# Patient Record
Sex: FEMALE | Race: OTHER | Employment: STUDENT | URBAN - METROPOLITAN AREA
[De-identification: names, ages, dates, MRNs, and addresses within clinical notes are randomized per-mention and may not be internally consistent; named-entity substitution may affect disease eponyms.]

---
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
## 2020-11-23 ENCOUNTER — IMMUNIZATION (OUTPATIENT)
Dept: FAMILY MEDICINE CLINIC | Facility: CLINIC | Age: 20
End: 2020-11-23
Payer: COMMERCIAL

## 2020-11-23 PROCEDURE — 90686 IIV4 VACC NO PRSV 0.5 ML IM: CPT | Performed by: NURSE PRACTITIONER

## 2020-11-23 PROCEDURE — 90471 IMMUNIZATION ADMIN: CPT | Performed by: NURSE PRACTITIONER

## 2021-08-13 ENCOUNTER — OFFICE VISIT (OUTPATIENT)
Dept: FAMILY MEDICINE CLINIC | Facility: CLINIC | Age: 21
End: 2021-08-13
Payer: COMMERCIAL

## 2021-08-13 VITALS
HEART RATE: 82 BPM | TEMPERATURE: 98 F | HEIGHT: 63 IN | WEIGHT: 111 LBS | DIASTOLIC BLOOD PRESSURE: 70 MMHG | SYSTOLIC BLOOD PRESSURE: 100 MMHG | RESPIRATION RATE: 16 BRPM | BODY MASS INDEX: 19.67 KG/M2

## 2021-08-13 DIAGNOSIS — Z13.89 SCREENING FOR GENITOURINARY CONDITION: ICD-10-CM

## 2021-08-13 DIAGNOSIS — Z00.00 LABORATORY EXAMINATION ORDERED AS PART OF A ROUTINE GENERAL MEDICAL EXAMINATION: ICD-10-CM

## 2021-08-13 DIAGNOSIS — Z00.00 PHYSICAL EXAM, ANNUAL: Primary | ICD-10-CM

## 2021-08-13 PROCEDURE — 3078F DIAST BP <80 MM HG: CPT | Performed by: FAMILY MEDICINE

## 2021-08-13 PROCEDURE — 3008F BODY MASS INDEX DOCD: CPT | Performed by: FAMILY MEDICINE

## 2021-08-13 PROCEDURE — 3074F SYST BP LT 130 MM HG: CPT | Performed by: FAMILY MEDICINE

## 2021-08-13 PROCEDURE — 99395 PREV VISIT EST AGE 18-39: CPT | Performed by: FAMILY MEDICINE

## 2021-08-13 NOTE — PROGRESS NOTES
HPI:   Bradley Currie is a 24year old female who presents for a complete physical exam. Symptoms: denies discharge, itching, burning or dysuria. Patient complains of having pain in her right knee after running. Went away after she stopped running.  She will otherwise  SKIN: denies any unusual skin lesions  EYES:denies blurred vision or double vision  HEENT: denies nasal congestion, sinus pain or ST  LUNGS: denies shortness of breath with exertion  CARDIOVASCULAR: denies chest pain on exertion  GI: denies abdo No prescriptions requested or ordered in this encounter   Healthy diet. Stay active. Do some exercises for inner thighs as we discussed in the office. Imaging & Consults:  None    Pap and pelvic deferred.  Health maintenance, will check fasting Rosa Maria Denise

## 2021-08-18 LAB
ABSOLUTE BASOPHILS: 40 CELLS/UL (ref 0–200)
ABSOLUTE EOSINOPHILS: 70 CELLS/UL (ref 15–500)
ABSOLUTE LYMPHOCYTES: 1993 CELLS/UL (ref 850–3900)
ABSOLUTE MONOCYTES: 339 CELLS/UL (ref 200–950)
ABSOLUTE NEUTROPHILS: 1958 CELLS/UL (ref 1500–7800)
ALBUMIN/GLOBULIN RATIO: 1.5 (CALC) (ref 1–2.5)
ALBUMIN: 4.4 G/DL (ref 3.6–5.1)
ALKALINE PHOSPHATASE: 53 U/L (ref 31–125)
ALT: 13 U/L (ref 6–29)
AST: 22 U/L (ref 10–30)
BASOPHILS: 0.9 %
BILIRUBIN, TOTAL: 0.3 MG/DL (ref 0.2–1.2)
BILIRUBIN: NEGATIVE
BUN: 22 MG/DL (ref 7–25)
CALCIUM: 9.2 MG/DL (ref 8.6–10.2)
CARBON DIOXIDE: 26 MMOL/L (ref 20–32)
CHLORIDE: 105 MMOL/L (ref 98–110)
CHOL/HDLC RATIO: 3 (CALC)
CHOLESTEROL, TOTAL: 130 MG/DL
COLOR: YELLOW
CREATININE: 0.71 MG/DL (ref 0.5–1.1)
EGFR IF AFRICN AM: 141 ML/MIN/1.73M2
EGFR IF NONAFRICN AM: 122 ML/MIN/1.73M2
EOSINOPHILS: 1.6 %
GLOBULIN: 3 G/DL (CALC) (ref 1.9–3.7)
GLUCOSE: 75 MG/DL (ref 65–99)
GLUCOSE: NEGATIVE
HDL CHOLESTEROL: 43 MG/DL
HEMATOCRIT: 38 % (ref 35–45)
HEMOGLOBIN: 12.4 G/DL (ref 11.7–15.5)
KETONES: NEGATIVE
LDL-CHOLESTEROL: 73 MG/DL (CALC)
LYMPHOCYTES: 45.3 %
MCH: 28.6 PG (ref 27–33)
MCHC: 32.6 G/DL (ref 32–36)
MCV: 87.8 FL (ref 80–100)
MONOCYTES: 7.7 %
MPV: 10.3 FL (ref 7.5–12.5)
NEUTROPHILS: 44.5 %
NITRITE: NEGATIVE
NON-HDL CHOLESTEROL: 87 MG/DL (CALC)
OCCULT BLOOD: NEGATIVE
PH: 6.5 (ref 5–8)
PLATELET COUNT: 179 THOUSAND/UL (ref 140–400)
POTASSIUM: 4.1 MMOL/L (ref 3.5–5.3)
PROTEIN, TOTAL: 7.4 G/DL (ref 6.1–8.1)
PROTEIN: NEGATIVE
RDW: 13.5 % (ref 11–15)
RED BLOOD CELL COUNT: 4.33 MILLION/UL (ref 3.8–5.1)
SODIUM: 137 MMOL/L (ref 135–146)
SPECIFIC GRAVITY: 1.03 (ref 1–1.03)
TRIGLYCERIDES: 64 MG/DL
TSH W/REFLEX TO FT4: 2.31 MIU/L
WHITE BLOOD CELL COUNT: 4.4 THOUSAND/UL (ref 3.8–10.8)

## 2021-08-23 ENCOUNTER — PATIENT MESSAGE (OUTPATIENT)
Dept: FAMILY MEDICINE CLINIC | Facility: CLINIC | Age: 21
End: 2021-08-23

## 2021-08-26 NOTE — TELEPHONE ENCOUNTER
From: Elham Aguiar  To: Elmira Ventura MD  Sent: 8/23/2021 10:46 AM CDT  Subject: Test Results Question    Hi, Dr. Helen Hennessy. I have a question regarding my HDL being on the lower level as shown in my blood work results.  Your recommendation to remed

## 2022-07-17 ENCOUNTER — PATIENT MESSAGE (OUTPATIENT)
Dept: FAMILY MEDICINE CLINIC | Facility: CLINIC | Age: 22
End: 2022-07-17

## 2022-07-18 NOTE — TELEPHONE ENCOUNTER
Pt mother calling regarding appointment request sent via [employer] message, Pt is hoping to get an appointment sometime this week for annual physical.    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 Pt mother is hoping to get pt in for Thursday or Friday appointment, Pt will have to fly into [place] for physical as she is currently in [place]. States pt leaving to med school on Monday 7/25. Pt mom wondering if okay for any other provider to see pt if Dr Reji Hdz unavailable?     Please advise if / when pt can be seen for physical?    **Pt mother requesting we call back pt directly at [phone]

## 2022-07-18 NOTE — TELEPHONE ENCOUNTER
Please contact pt to schedule for this Friday at 36 am with Dr Ivelisse Hoover (per Dr Froy Pederson)  Thank you

## 2022-07-18 NOTE — TELEPHONE ENCOUNTER
Please give patient a call we can see her this Friday at 7:15 AM she would have to come sooner so we can put her on the schedule and fill out her forms. Her last physical was August 2021 so do need to check with insurance if that physical will be covered by the insurance before she comes. If she cannot make an appointment on Friday let me know.   Thank you

## 2022-07-22 ENCOUNTER — OFFICE VISIT (OUTPATIENT)
Dept: FAMILY MEDICINE CLINIC | Facility: CLINIC | Age: 22
End: 2022-07-22
Payer: COMMERCIAL

## 2022-07-22 VITALS
HEART RATE: 76 BPM | SYSTOLIC BLOOD PRESSURE: 102 MMHG | DIASTOLIC BLOOD PRESSURE: 66 MMHG | RESPIRATION RATE: 16 BRPM | WEIGHT: 117 LBS | TEMPERATURE: 99 F | BODY MASS INDEX: 20.73 KG/M2 | HEIGHT: 63 IN

## 2022-07-22 DIAGNOSIS — Z00.00 LABORATORY TESTS ORDERED AS PART OF A COMPLETE PHYSICAL EXAM (CPE): ICD-10-CM

## 2022-07-22 DIAGNOSIS — Z00.00 PHYSICAL EXAM, ANNUAL: Primary | ICD-10-CM

## 2022-07-22 DIAGNOSIS — Z01.84 IMMUNITY STATUS TESTING: ICD-10-CM

## 2022-07-22 DIAGNOSIS — Z11.1 SCREENING FOR TUBERCULOSIS: ICD-10-CM

## 2022-07-22 PROCEDURE — 99395 PREV VISIT EST AGE 18-39: CPT | Performed by: FAMILY MEDICINE

## 2022-07-22 PROCEDURE — 3008F BODY MASS INDEX DOCD: CPT | Performed by: FAMILY MEDICINE

## 2022-07-22 PROCEDURE — 3074F SYST BP LT 130 MM HG: CPT | Performed by: FAMILY MEDICINE

## 2022-07-22 PROCEDURE — 3078F DIAST BP <80 MM HG: CPT | Performed by: FAMILY MEDICINE

## 2022-07-22 RX ORDER — MEDROXYPROGESTERONE ACETATE 10 MG/1
TABLET ORAL
COMMUNITY
Start: 2022-02-23 | End: 2022-07-22 | Stop reason: CLARIF

## 2022-07-24 LAB
ABSOLUTE BASOPHILS: 50 CELLS/UL (ref 0–200)
ABSOLUTE EOSINOPHILS: 41 CELLS/UL (ref 15–500)
ABSOLUTE LYMPHOCYTES: 1764 CELLS/UL (ref 850–3900)
ABSOLUTE MONOCYTES: 437 CELLS/UL (ref 200–950)
ABSOLUTE NEUTROPHILS: 2210 CELLS/UL (ref 1500–7800)
ALBUMIN/GLOBULIN RATIO: 1.5 (CALC) (ref 1–2.5)
ALBUMIN: 4.7 G/DL (ref 3.6–5.1)
ALKALINE PHOSPHATASE: 58 U/L (ref 31–125)
ALT: 20 U/L (ref 6–29)
APPEARANCE: CLEAR
AST: 36 U/L (ref 10–30)
BASOPHILS: 1.1 %
BILIRUBIN, TOTAL: 0.7 MG/DL (ref 0.2–1.2)
BILIRUBIN: NEGATIVE
BUN/CREATININE RATIO: 36 (CALC) (ref 6–22)
BUN: 26 MG/DL (ref 7–25)
CALCIUM: 10 MG/DL (ref 8.6–10.2)
CARBON DIOXIDE: 28 MMOL/L (ref 20–32)
CHLORIDE: 103 MMOL/L (ref 98–110)
CHOL/HDLC RATIO: 3 (CALC)
CHOLESTEROL, TOTAL: 150 MG/DL
CREATININE: 0.73 MG/DL (ref 0.5–0.96)
EGFR: 119 ML/MIN/1.73M2
EOSINOPHILS: 0.9 %
GLOBULIN: 3.1 G/DL (CALC) (ref 1.9–3.7)
GLUCOSE: 78 MG/DL (ref 65–99)
GLUCOSE: NEGATIVE
HDL CHOLESTEROL: 50 MG/DL
HEMATOCRIT: 38.5 % (ref 35–45)
HEMOGLOBIN: 12.8 G/DL (ref 11.7–15.5)
HEPATITIS B SURFACE$ANTIBODY (QUANT): <5 MIU/ML
KETONES: NEGATIVE
LDL-CHOLESTEROL: 84 MG/DL (CALC)
LEUKOCYTE ESTERASE: NEGATIVE
LYMPHOCYTES: 39.2 %
MCH: 29.4 PG (ref 27–33)
MCHC: 33.2 G/DL (ref 32–36)
MCV: 88.5 FL (ref 80–100)
MITOGEN-NIL: >10 IU/ML
MONOCYTES: 9.7 %
MPV: 9.5 FL (ref 7.5–12.5)
NEUTROPHILS: 49.1 %
NIL: 0.01 IU/ML
NITRITE: NEGATIVE
NON-HDL CHOLESTEROL: 100 MG/DL (CALC)
OCCULT BLOOD: NEGATIVE
PH: 6.5 (ref 5–8)
PLATELET COUNT: 219 THOUSAND/UL (ref 140–400)
POTASSIUM: 4.3 MMOL/L (ref 3.5–5.3)
PROTEIN, TOTAL: 7.8 G/DL (ref 6.1–8.1)
QUANTIFERON(R)-TB GOLD PLUS, 1 TUBE: NEGATIVE
RDW: 12.6 % (ref 11–15)
RED BLOOD CELL COUNT: 4.35 MILLION/UL (ref 3.8–5.1)
SODIUM: 139 MMOL/L (ref 135–146)
SPECIFIC GRAVITY: 1.03 (ref 1–1.03)
TB1-NIL: 0 IU/ML
TB2-NIL: 0 IU/ML
TRIGLYCERIDES: 69 MG/DL
TSH W/REFLEX TO FT4: 2.3 MIU/L
WHITE BLOOD CELL COUNT: 4.5 THOUSAND/UL (ref 3.8–10.8)

## 2022-07-26 ENCOUNTER — TELEPHONE (OUTPATIENT)
Dept: FAMILY MEDICINE CLINIC | Facility: CLINIC | Age: 22
End: 2022-07-26

## 2022-07-26 NOTE — TELEPHONE ENCOUNTER
School form is completed by Dr Kristine Romo. I called and notified Pt. Completed form given to SYLVESTER.

## 2022-07-27 ENCOUNTER — TELEPHONE (OUTPATIENT)
Dept: FAMILY MEDICINE CLINIC | Facility: CLINIC | Age: 22
End: 2022-07-27

## 2022-07-27 ENCOUNTER — PATIENT MESSAGE (OUTPATIENT)
Dept: FAMILY MEDICINE CLINIC | Facility: CLINIC | Age: 22
End: 2022-07-27

## 2022-07-27 NOTE — TELEPHONE ENCOUNTER
Called the Pt to let her know that she needs a Hep B booster. Pt states she got the hep B booster today . Advised Pt to update the vaccine information through my chart or fax it to us.

## 2022-07-28 ENCOUNTER — MED REC SCAN ONLY (OUTPATIENT)
Dept: FAMILY MEDICINE CLINIC | Facility: CLINIC | Age: 22
End: 2022-07-28

## 2023-02-01 ENCOUNTER — PATIENT MESSAGE (OUTPATIENT)
Dept: FAMILY MEDICINE CLINIC | Facility: CLINIC | Age: 23
End: 2023-02-01

## 2023-02-06 ENCOUNTER — MED REC SCAN ONLY (OUTPATIENT)
Dept: FAMILY MEDICINE CLINIC | Facility: CLINIC | Age: 23
End: 2023-02-06

## 2023-02-13 ENCOUNTER — TELEPHONE (OUTPATIENT)
Dept: FAMILY MEDICINE CLINIC | Facility: CLINIC | Age: 23
End: 2023-02-13

## 2023-02-13 NOTE — TELEPHONE ENCOUNTER
S/w Natalie     She stated she had Hep B drawn on 1/30/23 and received response by her PCP regarding the result. No result is found in chart. I called [facility] who stated this lab was done out of state so it will not display in the chart. [facility] faxed copy of Hep B and placed at the  for HIPAA approved parents to  for this patient. 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

## 2023-02-17 ENCOUNTER — TELEPHONE (OUTPATIENT)
Dept: FAMILY MEDICINE CLINIC | Facility: CLINIC | Age: 23
End: 2023-02-17

## 2023-02-17 NOTE — TELEPHONE ENCOUNTER
We have received patient testing for hepatitis B. Patient has immunity against hepatitis B. Her titer was [id number].   Thank you